# Patient Record
Sex: FEMALE | Race: BLACK OR AFRICAN AMERICAN
[De-identification: names, ages, dates, MRNs, and addresses within clinical notes are randomized per-mention and may not be internally consistent; named-entity substitution may affect disease eponyms.]

---

## 2019-06-24 ENCOUNTER — HOSPITAL ENCOUNTER (OUTPATIENT)
Dept: HOSPITAL 62 - LAB | Age: 19
End: 2019-06-24
Attending: NURSE PRACTITIONER
Payer: OTHER GOVERNMENT

## 2019-06-24 DIAGNOSIS — N39.0: Primary | ICD-10-CM

## 2019-06-24 DIAGNOSIS — N89.8: ICD-10-CM

## 2019-06-24 LAB
BACTERIA (WET MOUNT): (no result)
CHLAM PCR: NOT DETECTED
EPITHELIALS (WET MOUNT): (no result)
T.VAGINALIS (WET MOUNT): (no result)
WBCS (WET MOUNT): (no result)
YEAST (WET MOUNT): (no result)

## 2019-06-24 PROCEDURE — 87088 URINE BACTERIA CULTURE: CPT

## 2019-06-24 PROCEDURE — 87210 SMEAR WET MOUNT SALINE/INK: CPT

## 2019-06-24 PROCEDURE — 87086 URINE CULTURE/COLONY COUNT: CPT

## 2019-06-24 PROCEDURE — 87591 N.GONORRHOEAE DNA AMP PROB: CPT

## 2019-06-24 PROCEDURE — 87186 SC STD MICRODIL/AGAR DIL: CPT

## 2019-06-24 PROCEDURE — 87491 CHLMYD TRACH DNA AMP PROBE: CPT

## 2019-10-31 ENCOUNTER — HOSPITAL ENCOUNTER (EMERGENCY)
Dept: HOSPITAL 62 - ER | Age: 19
Discharge: HOME | End: 2019-10-31
Payer: OTHER GOVERNMENT

## 2019-10-31 VITALS — DIASTOLIC BLOOD PRESSURE: 60 MMHG | SYSTOLIC BLOOD PRESSURE: 101 MMHG

## 2019-10-31 DIAGNOSIS — S06.9X9A: Primary | ICD-10-CM

## 2019-10-31 DIAGNOSIS — Z79.1: ICD-10-CM
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
## 2019-10-31 DIAGNOSIS — G43.909: ICD-10-CM

## 2019-10-31 DIAGNOSIS — S00.83XA: ICD-10-CM

## 2019-10-31 PROCEDURE — 70450 CT HEAD/BRAIN W/O DYE: CPT
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
